# Patient Record
Sex: FEMALE | Race: BLACK OR AFRICAN AMERICAN | Employment: FULL TIME | URBAN - METROPOLITAN AREA
[De-identification: names, ages, dates, MRNs, and addresses within clinical notes are randomized per-mention and may not be internally consistent; named-entity substitution may affect disease eponyms.]

---

## 2017-07-19 ENCOUNTER — OFFICE VISIT (OUTPATIENT)
Dept: ORTHOPEDIC SURGERY | Age: 22
End: 2017-07-19

## 2017-07-19 VITALS
BODY MASS INDEX: 32.14 KG/M2 | TEMPERATURE: 98.6 F | HEIGHT: 66 IN | WEIGHT: 200 LBS | SYSTOLIC BLOOD PRESSURE: 124 MMHG | RESPIRATION RATE: 18 BRPM | DIASTOLIC BLOOD PRESSURE: 68 MMHG | HEART RATE: 96 BPM | OXYGEN SATURATION: 98 %

## 2017-07-19 DIAGNOSIS — M54.2 NECK PAIN: Primary | ICD-10-CM

## 2017-07-19 RX ORDER — IBUPROFEN 600 MG/1
TABLET ORAL
Refills: 0 | COMMUNITY
Start: 2017-06-09 | End: 2021-07-16

## 2017-07-19 RX ORDER — METHOCARBAMOL 750 MG/1
TABLET, FILM COATED ORAL
Refills: 0 | COMMUNITY
Start: 2017-06-09 | End: 2021-07-16

## 2017-07-19 RX ORDER — BUPIVACAINE HYDROCHLORIDE 2.5 MG/ML
4 INJECTION, SOLUTION INFILTRATION; PERINEURAL ONCE
Qty: 4 ML | Refills: 0
Start: 2017-07-19 | End: 2017-07-19

## 2017-07-19 RX ORDER — LIDOCAINE HYDROCHLORIDE 20 MG/ML
4 INJECTION, SOLUTION EPIDURAL; INFILTRATION; INTRACAUDAL; PERINEURAL ONCE
Qty: 4 ML | Refills: 0
Start: 2017-07-19 | End: 2017-07-19

## 2017-07-19 RX ORDER — BETAMETHASONE SODIUM PHOSPHATE AND BETAMETHASONE ACETATE 3; 3 MG/ML; MG/ML
12 INJECTION, SUSPENSION INTRA-ARTICULAR; INTRALESIONAL; INTRAMUSCULAR; SOFT TISSUE ONCE
Qty: 2 ML | Refills: 0
Start: 2017-07-19 | End: 2017-07-19
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
## 2017-07-19 NOTE — MR AVS SNAPSHOT
Visit Information Date & Time Provider Department Dept. Phone Encounter #  
 7/19/2017  2:00 PM Annie Atkinson MD [facility] [phone] [id number] Follow-up Instructions Return in about 6 weeks (around 8/30/2017), or if symptoms worsen or fail to improve. Upcoming Health Maintenance Date Due  
 HPV AGE 9Y-34Y (1 of 3 - Female 3 Dose Series) 3/27/2007 DTaP/Tdap/Td series (1 - Tdap) 3/27/2017 PAP AKA CERVICAL CYTOLOGY 3/27/2017 INFLUENZA AGE 9 TO ADULT 8/1/2017 Allergies as of 7/19/2017  Review Complete On: 7/19/2017 By: Janki Rivera No Known Allergies Current Immunizations  Never Reviewed No immunizations on file. Not reviewed this visit You Were Diagnosed With   
  
 Codes Comments Myofascial pain    -  Primary ICD-10-CM: M79.1 ICD-9-CM: 729.1 Neck pain     ICD-10-CM: M54.2 ICD-9-CM: 723.1 Vitals BP Pulse Temp Resp Height(growth percentile) Weight(growth percentile) 124/68 96 98.6 °F (37 °C) (Oral) 18 5' 6\" (1.676 m) 200 lb (90.7 kg) SpO2 BMI OB Status Smoking Status 98% 32.28 kg/m2 Having regular periods Never Smoker BMI and BSA Data Body Mass Index Body Surface Area  
 32.28 kg/m 2 2.06 m 2 Preferred Pharmacy Pharmacy Name Phone [id number] [address] [place] [address],[address] [facility] [phone] Your Updated Medication List  
  
   
This list is accurate as of: 7/19/17  3:18 PM.  Always use your most recent med list.  
  
  
  
  
 betamethasone 6 mg/mL injection Commonly known as:  CELESTONE SOLUSPAN  
2 mL by IntraMUSCular route once for 1 dose. bupivacaine 0.25 % (2.5 mg/mL) Soln injection Commonly known as:  MARCAINE  
4 mL by SubCUTAneous route once for 1 dose. butalbital-acetaminophen-caffeine -40 mg per tablet Commonly known as:  [employer] Take 1-2 tablets by mouth every 4 hours as needed for headache. Maximum dose 6 capsules daily. DEPO-PROVERA 150 mg/mL injection Generic drug:  medroxyPROGESTERone 150 mg by IntraMUSCular route once. ibuprofen 600 mg tablet Commonly known as:  MOTRIN  
TAKE 1 TABLET BY MOUTH EVERY 8 HOURS AS NEEDED PAIN. TAKE WITH ROBAXIN AND FOOD FOR UP TO 10 DAYS  
  
 lidocaine (PF) 20 mg/mL (2 %) injection Commonly known as:  XYLOCAINE  
4 mL by Other route once for 1 dose. methocarbamol 750 mg tablet Commonly known as:  ROBAXIN  
TAKE 1 TABLET BY MOUTH 3 TIMES A DAY FOR 10 DAYS. TAKE WITH IBUPROFEN AND FOOD  
  
 ondansetron hcl 4 mg tablet Commonly known as:  ZOFRAN (AS HYDROCHLORIDE) Take 1 Tab by mouth every eight (8) hours as needed for Nausea. We Performed the Following AMB POC XRAY, SPINE, CERVICAL; 2 OR 3 [40778 CPT(R)] BETAMETHASONE ACETATE & SODIUM PHOSPHATE INJECTION 3 MG EA. [ [facility]] INJECTION, BUPIVICAINE HYDRO [ [facility]] LIDOCAINE INJECTION [ [facility]] GA INJECT TRIGGER POINT, 1 OR 2 [id number] CPT(R)] REFERRAL TO PHYSICAL THERAPY [XFE90 Custom] Comments:  
 eval and treat Pt has myofascial pain in the cervical region. Follow-up Instructions Return in about 6 weeks (around 8/30/2017), or if symptoms worsen or fail to improve. Referral Information Referral ID Referred By Referred To  
  
 [id number] JAYY Saleh Not Available Visits Status Start Date End Date 1 New Request 7/19/17 7/19/18 If your referral has a status of pending review or denied, additional information will be sent to support the outcome of this decision. Introducing [facility]! Falguni Grimes introduces [employer] patient portal. Now you can access parts of your medical record, email your doctor's office, and request medication refills online. 1. In your internet browser, go to [email or web address] 2. Click on the First Time User? Click Here link in the Sign In box. You will see the New Member Sign Up page. 3. Enter your [employer] group Access Code exactly as it appears below. You will not need to use this code after youve completed the sign-up process. If you do not sign up before the expiration date, you must request a new code. · [employer] Access Code: [email or web address] Expires: 10/17/2017  2:42 PM 
 
4. Enter the last four digits of your Social Security Number (xxxx) and Date of Birth (mm/dd/yyyy) as indicated and click Submit. You will be taken to the next sign-up page. 5. Create a [employer] group ID. This will be your [employer] group login ID and cannot be changed, so think of one that is secure and easy to remember. 6. Create a [employer] group password. You can change your password at any time. 7. Enter your Password Reset Question and Answer. This can be used at a later time if you forget your password. 8. Enter your e-mail address. You will receive e-mail notification when new information is available in [address] 9. Click Sign Up. You can now view and download portions of your medical record. 10. Click the Download Summary menu link to download a portable copy of your medical information. If you have questions, please visit the Frequently Asked Questions section of the [employer] group website. Remember, [employer] group is NOT to be used for urgent needs. For medical emergencies, dial 911. Now available from your iPhone and Android! Please provide this summary of care documentation to your next provider. Your primary care clinician is listed as Tere Reynolds. If you have any questions after today's visit, please call [phone].

## 2017-07-27 ENCOUNTER — HOSPITAL ENCOUNTER (OUTPATIENT)
Dept: PHYSICAL THERAPY | Age: 22
Discharge: HOME | End: 2017-07-27
Payer: COMMERCIAL

## 2017-07-27 PROCEDURE — 97161 PT EVAL LOW COMPLEX 20 MIN: CPT

## 2017-07-27 PROCEDURE — 97110 THERAPEUTIC EXERCISES: CPT

## 2017-07-27 NOTE — PROGRESS NOTES
PT DAILY TREATMENT NOTE     Patient Name: Kvng Andrews  Date:2017  : 1995  [x]  Patient  Verified  Payor: BLUE CROSS / Plan: [facility] PPO / Product Type: PPO /    In time:1030  Out time:1106  Total Treatment Time (min): 36  Visit #: 1 of     Treatment Area: Cervicalgia [M54.2]    SUBJECTIVE  Pain Level (0-10 scale): 1  Any medication changes, allergies to medications, adverse drug reactions, diagnosis change, or new procedure performed?: [x] No    [] Yes (see summary sheet for update)  Subjective functional status/changes:   [] No changes reported       OBJECTIVE    Modality rationale:    Min Type Additional Details    [] Estim:  []Unatt       []IFC  []Premod                        []Other:  []w/ice   []w/heat  Position:  Location:    [] Estim: []Att    []TENS instruct  []NMES                    []Other:  []w/US   []w/ice   []w/heat  Position:  Location:    []  Traction: [] Cervical       []Lumbar                       [] Prone          []Supine                       []Intermittent   []Continuous Lbs:  [] before manual  [] after manual    []  Ultrasound: []Continuous   [] Pulsed                           []1MHz   []3MHz W/cm2:  Location:     []  Iontophoresis with dexamethasone         Location: [] Take home patch   [] In clinic    []  Ice     []  heat  []  Ice massage  []  Laser   []  Anodyne Position:  Location:    []  Laser with stim  []  Other:  Position:  Location:    []  Vasopneumatic Device Pressure:       [] lo [] med [] hi   Temperature: [] lo [] med [] hi   [] Skin assessment post-treatment:  []intact []redness- no adverse reaction    []redness - adverse reaction:     26 min [x]Eval                  []Re-Eval       10 min Therapeutic Exercise:  [] See flow sheet : HEP   Rationale: increase ROM and increase strength to improve the patients ability to perform ADL              With   [] TE   [] TA   [] neuro   [] other: Patient Education: [x] Review HEP    [] Progressed/Changed HEP based on:   [] positioning   [] body mechanics   [] transfers   [] heat/ice application    [] other:      Other Objective/Functional Measures:       Pain Level (0-10 scale) post treatment:  1    ASSESSMENT/Changes in Function:      Patient will continue to benefit from skilled PT services to modify and progress therapeutic interventions, address functional mobility deficits, address ROM deficits, address strength deficits, analyze and address soft tissue restrictions and analyze and cue movement patterns to attain remaining goals.      [x]  See Plan of Care  []  See progress note/recertification  []  See Discharge Summary         Progress towards goals / Updated goals:  Per POC    PLAN  []  Upgrade activities as tolerated     [x]  Continue plan of care  []  Update interventions per flow sheet       []  Discharge due to:_  []  Other:_      Anthony Freeman, PT 7/27/2017  10:37 AM    Future Appointments  Date Time Provider Sandra Jones   8/31/2017 9:30 AM Hina Winn  [address]
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
## 2017-07-31 ENCOUNTER — HOSPITAL ENCOUNTER (OUTPATIENT)
Dept: PHYSICAL THERAPY | Age: 22
Discharge: HOME | End: 2017-07-31
Payer: COMMERCIAL

## 2017-07-31 PROCEDURE — 97140 MANUAL THERAPY 1/> REGIONS: CPT

## 2017-07-31 PROCEDURE — 97112 NEUROMUSCULAR REEDUCATION: CPT

## 2017-07-31 PROCEDURE — 97110 THERAPEUTIC EXERCISES: CPT

## 2017-07-31 NOTE — PROGRESS NOTES
PT DAILY TREATMENT NOTE 3-16    Patient Name: Dejuan Love  Date:2017  : 1995  [x]  Patient  Verified  Payor: Grant Escudero / Plan: [facility] PPO / Product Type: PPO /    In time:2:32  Out time:3:24  Total Treatment Time (min): 52  Visit #: 2 of 8-12    Treatment Area: Cervicalgia [M54.2]    SUBJECTIVE  Pain Level (0-10 scale): 2  Any medication changes, allergies to medications, adverse drug reactions, diagnosis change, or new procedure performed?: [x] No    [] Yes (see summary sheet for update)  Subjective functional status/changes:   [] No changes reported  \"The neck is feeling pretty good today. It is one of those good days. I have not been doing the exercises at home. \" Patient denies any tingling.      OBJECTIVE  Modality rationale: decrease pain and increase tissue extensibility to improve the patients ability to ease ADL tolerance   Min Type Additional Details    [] Estim:  []Unatt       []IFC  []Premod                        []Other:  []w/ice   []w/heat  Position:  Location:    [] Estim: []Att    []TENS instruct  []NMES                    []Other:  []w/US   []w/ice   []w/heat  Position:  Location:    []  Traction: [] Cervical       []Lumbar                       [] Prone          []Supine                       []Intermittent   []Continuous Lbs:  [] before manual  [] after manual    []  Ultrasound: []Continuous   [] Pulsed                           []1MHz   []3MHz Location:  W/cm2:    []  Iontophoresis with dexamethasone         Location: [] Take home patch   [] In clinic   10 []  Ice     [x]  heat  []  Ice massage  []  Laser   []  Anodyne Position: supine with wedge  Location: c/s    []  Laser with stim  []  Other: Position:  Location:    []  Vasopneumatic Device Pressure:       [] lo [] med [] hi   Temperature: [] lo [] med [] hi   [x] Skin assessment post-treatment:  [x]intact []redness- no adverse reaction    []redness - adverse reaction:     24 min Therapeutic Exercise: [x] See flow sheet :   Rationale: increase ROM, increase strength and improve coordination to improve the patients ability to decrease pain with driving    10 min Neuromuscular Re-education:  [x]  See flow sheet : postural re-ed activities   Rationale: increase strength, improve coordination and increase proprioception  to improve the patients ability to improve ability to maintain upright posture     8 min Manual Therapy:  Cervical spine , STM/TPR to bilateral UT and LS   Rationale: decrease pain, increase ROM and increase tissue extensibility to ease ADL tolerance           With   [] TE   [] TA   [] neuro   [] other: Patient Education: [x] Review HEP    [] Progressed/Changed HEP based on:   [] positioning   [] body mechanics   [] transfers   [] heat/ice application    [] other:      Other Objective/Functional Measures: first follow up session, cues for sequencing and correct therex form throughout  Cues to maintain c/s retraction with wall letters   Tactile cues to activate lower trap with wall letter \"y\"    Patient presents with forward rounded shoulders, forward head, and trunk lean to right--cues throughout for upright posture    Pain Level (0-10 scale) post treatment: 0    ASSESSMENT/Changes in Function: Initiated POC per flowsheet. Patient is very pleasant and puts forth good effort with therapy, however, denies compliance with HEP, therapist encourages patient to remain compliant. Patient presents with LS tightness, R>L. Requires frequent cueing to avoid UT hike. Will continue cueing for upright posture. Patient will continue to benefit from skilled PT services to modify and progress therapeutic interventions, address functional mobility deficits, address ROM deficits, address strength deficits, analyze and address soft tissue restrictions, analyze and cue movement patterns, analyze and modify body mechanics/ergonomics and assess and modify postural abnormalities to attain remaining goals.      [] See Plan of Care  []  See progress note/recertification  []  See Discharge Summary         Short Term Goals: To be accomplished in 1 weeks:                         1. Pt will be I and compliant with HEP. -not met per patient report (7/31/2017)  Long Term Goals: To be accomplished in 4 weeks:                         1.improve FOTO to 67 to improve ability for functional tasks                         2. Improve B c/s rotation to 60 degrees to improve ability for driving                         3. Improve lower trap MMT to 4/5 to reduce UT strain and improve activity tolerance                         4. Pt will report being able to perform overhead tasks without difficulty.      PLAN  []  Upgrade activities as tolerated     [x]  Continue plan of care  []  Update interventions per flow sheet       []  Discharge due to:_  []  Other:_      Parveen Richardson 7/31/2017  2:31 PM    Future Appointments  Date Time Provider Sandra Jones   8/1/2017 3:30 PM Parveen Richardson [facility]   8/8/2017 2:30 PM Leopoldo Filter, PTA [facility]   8/11/2017 2:30 PM Hernesto Wells, PT [facility]   8/14/2017 2:30 PM Leopoldo Filter, PTA [facility]   8/16/2017 2:30 PM Leopoldo Filter, PTA [facility]   8/25/2017 2:30 PM Hernesto Wells, PT [facility]   8/31/2017 9:30 AM Eitan Gonzalez  [address]

## 2017-08-01 ENCOUNTER — HOSPITAL ENCOUNTER (OUTPATIENT)
Dept: PHYSICAL THERAPY | Age: 22
Discharge: HOME | End: 2017-08-01
Payer: COMMERCIAL

## 2017-08-01 PROCEDURE — 97140 MANUAL THERAPY 1/> REGIONS: CPT

## 2017-08-01 PROCEDURE — 97112 NEUROMUSCULAR REEDUCATION: CPT

## 2017-08-01 PROCEDURE — 97110 THERAPEUTIC EXERCISES: CPT

## 2017-08-01 NOTE — PROGRESS NOTES
PT DAILY TREATMENT NOTE 3-16    Patient Name: Ros Lao  Date:2017  : 1995  [x]  Patient  Verified  Payor: BLUE CROSS / Plan: [facility] PPO / Product Type: PPO /    In time:3:30  Out time:4:06  Total Treatment Time (min): 36  Visit #: 3 of     Treatment Area: Cervicalgia [M54.2]    SUBJECTIVE  Pain Level (0-10 scale): 0  Any medication changes, allergies to medications, adverse drug reactions, diagnosis change, or new procedure performed?: [x] No    [] Yes (see summary sheet for update)  Subjective functional status/changes:   [] No changes reported  \"I felt really good after last time. \"    OBJECTIVE  Modality rationale: PD   Min Type Additional Details    [] Estim:  []Unatt       []IFC  []Premod                        []Other:  []w/ice   []w/heat  Position:  Location:    [] Estim: []Att    []TENS instruct  []NMES                    []Other:  []w/US   []w/ice   []w/heat  Position:  Location:    []  Traction: [] Cervical       []Lumbar                       [] Prone          []Supine                       []Intermittent   []Continuous Lbs:  [] before manual  [] after manual    []  Ultrasound: []Continuous   [] Pulsed                           []1MHz   []3MHz Location:  W/cm2:    []  Iontophoresis with dexamethasone         Location: [] Take home patch   [] In clinic    []  Ice     []  heat  []  Ice massage  []  Laser   []  Anodyne Position:  Location:    []  Laser with stim  []  Other: Position:  Location:    []  Vasopneumatic Device Pressure:       [] lo [] med [] hi   Temperature: [] lo [] med [] hi   [] Skin assessment post-treatment:  []intact []redness- no adverse reaction    []redness - adverse reaction:     18 min Therapeutic Exercise:  [x] See flow sheet :   Rationale: increase ROM, increase strength and improve coordination to improve the patients ability to decrease pain with driving     10 min Neuromuscular Re-education:  [x]  See flow sheet : postural re-ed activites   Rationale: increase strength, improve coordination and increase proprioception  to improve the patients ability to maintain upright posture     8 min Manual Therapy:  STM/TPR to bilateral UTs, c/s , rib springing   Rationale: decrease pain, increase ROM and increase tissue extensibility to ease ADL tolerance           With   [] TE   [] TA   [] neuro   [] other: Patient Education: [x] Review HEP    [] Progressed/Changed HEP based on:   [] positioning   [] body mechanics   [] transfers   [] heat/ice application    [] other:      Other Objective/Functional Measures:   C/s rotation: R: 47 degrees. Left: 55 degrees. Pain Level (0-10 scale) post treatment: 0    ASSESSMENT/Changes in Function: Patient is making good progress towards goals, reports compliance with HEP today and is better able to maintian upright posture, able to correct trunk lean to right during theraband reps. Patient also reports \"I felt really good after last time. \" Cervical spine rotation has increased since initial eval with no reports of pain at end range. Continue per POC. Patient will continue to benefit from skilled PT services to modify and progress therapeutic interventions, address functional mobility deficits, address ROM deficits, address strength deficits, analyze and address soft tissue restrictions, analyze and cue movement patterns, analyze and modify body mechanics/ergonomics and assess and modify postural abnormalities to attain remaining goals. []  See Plan of Care  []  See progress note/recertification  []  See Discharge Summary         Short Term Goals: To be accomplished in 1 weeks:                         5. Pt will be I and compliant with HEP. -met per patient report (8/1/2017)  Long Term Goals: To be accomplished in 4 weeks:                         6.YXCMKZK FOTO to 79 to improve ability for functional tasks                         2.  Improve B c/s rotation to 60 degrees to improve ability for driving.-progressing, R: 47 degrees. L: 55 degrees with no c/o pain (8/1/2017)                         3. Improve lower trap MMT to 4/5 to reduce UT strain and improve activity tolerance                         4. Pt will report being able to perform overhead tasks without difficulty.      PLAN  []  Upgrade activities as tolerated     [x]  Continue plan of care  []  Update interventions per flow sheet       []  Discharge due to:_  []  Other:_      Gustavo Roberts 8/1/2017  3:31 PM    Future Appointments  Date Time Provider Sandra Jones   8/8/2017 2:30 PM Paul Olmedo, PTA [facility]   8/11/2017 2:30 PM Homa Fitzgerald, PT [facility]   8/14/2017 2:30 PM Paul Olmedo, PTA [facility]   8/16/2017 2:30 PM Paul Olmedo, PTA [facility]   8/25/2017 2:30 PM Homa Fitzgerald, PT [facility]   8/31/2017 9:30 AM Lindy Drummond  [address]

## 2017-08-08 ENCOUNTER — HOSPITAL ENCOUNTER (OUTPATIENT)
Dept: PHYSICAL THERAPY | Age: 22
Discharge: HOME | End: 2017-08-08
Payer: COMMERCIAL

## 2017-08-08 PROCEDURE — 97110 THERAPEUTIC EXERCISES: CPT

## 2017-08-08 PROCEDURE — 97140 MANUAL THERAPY 1/> REGIONS: CPT

## 2017-08-08 PROCEDURE — 97112 NEUROMUSCULAR REEDUCATION: CPT

## 2017-08-08 NOTE — PROGRESS NOTES
PT DAILY TREATMENT NOTE     Patient Name: Ángel Prescott  Date:2017  : 1995  [x]  Patient  Verified  Payor: BLUE CROSS / Plan: [id number] PPO / Product Type: PPO /    In time:2:30  Out time:3:20  Total Treatment Time (min): 50  Visit #: 4 of     Treatment Area: Cervicalgia [M54.2]    SUBJECTIVE  Pain Level (0-10 scale): 6/10  Any medication changes, allergies to medications, adverse drug reactions, diagnosis change, or new procedure performed?: [x] No    [] Yes (see summary sheet for update)  Subjective functional status/changes:   [] No changes reported  \"I'm really achy today. \"    OBJECTIVE    30 min Therapeutic Exercise:  [x] See flow sheet :   Rationale: increase ROM and increase strength to improve the patients ability to perform ADL's. 10 min Neuromuscular Re-education:  [x]  See flow sheet :   Rationale: increase strength and increase proprioception  to improve the patients ability to perform functional activities. 10 min Manual Therapy:  T/S PA mobs, rib springs, [place] technique to (B) UT, lev scap and C/S paraspinals. Rationale: decrease pain, increase ROM, increase tissue extensibility and decrease trigger points to improve activity tolerance. With   [x] TE   [] TA   [] neuro   [] other: Patient Education: [x] Review HEP    [] Progressed/Changed HEP based on:   [] positioning   [] body mechanics   [] transfers   [] heat/ice application    [] other:      Other Objective/Functional Measures: No changes with there ex. Cueing to correct posture, extension education on the importance of good posture. Pain Level (0-10 scale) post treatment: 3/10    ASSESSMENT/Changes in Function: Priscilla Durham. Pt reported a significant decrease in tension/pain post treatment, \"feels a lot looser and I'm not nearly in as much pain as I was. \"    Patient will continue to benefit from skilled PT services to modify and progress therapeutic interventions, address functional mobility deficits, address ROM deficits, address strength deficits, analyze and address soft tissue restrictions and analyze and modify body mechanics/ergonomics to attain remaining goals. [x]  See Plan of Care  []  See progress note/recertification  []  See Discharge Summary         Progress towards goals / Updated goals:  Short Term Goals: To be accomplished in 1 weeks:                         1. Pt will be I and compliant with HEP. -met per patient report (8/1/2017)  Long Term Goals: To be accomplished in 4 weeks:                         6.ZQCKIAA FOTO to 79 to improve ability for functional tasks                         2. Improve B c/s rotation to 60 degrees to improve ability for driving.-progressing, R: 47 degrees. L: 55 degrees with no c/o pain (8/1/2017)                         3. Improve lower trap MMT to 4/5 to reduce UT strain and improve activity tolerance                         4. Pt will report being able to perform overhead tasks without difficulty.      PLAN  []  Upgrade activities as tolerated     [x]  Continue plan of care  []  Update interventions per flow sheet       []  Discharge due to:_  []  Other:_      Augustus Chu, PTA 8/8/2017  2:26 PM    Future Appointments  Date Time Provider Sandra Jones   8/8/2017 2:30 PM Augustus Chu, PTA [facility]   8/11/2017 2:30 PM Armando Arrington, PTA [facility]   8/14/2017 2:30 PM Augustus Chu, PTA [facility]   8/16/2017 2:30 PM Augustus Chu, PTA [facility]   8/25/2017 2:30 PM [facility]   8/31/2017 9:30 AM Karma Guido  [address]

## 2017-08-11 ENCOUNTER — HOSPITAL ENCOUNTER (OUTPATIENT)
Dept: PHYSICAL THERAPY | Age: 22
Discharge: HOME | End: 2017-08-11
Payer: COMMERCIAL

## 2017-08-11 PROCEDURE — 97140 MANUAL THERAPY 1/> REGIONS: CPT

## 2017-08-11 PROCEDURE — 97110 THERAPEUTIC EXERCISES: CPT

## 2017-08-11 NOTE — PROGRESS NOTES
PT DAILY TREATMENT NOTE     Patient Name: Cleveland Johnson  Date:2017  : 1995  [x]  Patient  Verified  Payor: BLUE CROSS / Plan: [facility]ik [id number] PPO / Product Type: PPO /    In time:2:30  Out time:3:00  Total Treatment Time (min): 30  Visit #: 5 of     Treatment Area: Cervicalgia [M54.2]    SUBJECTIVE  Pain Level (0-10 scale): 210  Any medication changes, allergies to medications, adverse drug reactions, diagnosis change, or new procedure performed?: [x] No    [] Yes (see summary sheet for update)  Subjective functional status/changes:   [] No changes reported  Pt reports no new complaints. Pt reports decreased pain since last treatment. Pt reports she feels that the graston treatment has helped her greatly. OBJECTIVE    22 min Therapeutic Exercise:  [x] See flow sheet :   Rationale: increase ROM and increase strength to improve the patients ability to tolerate ADLs. 8 min Manual Therapy:  Rib springs,  C/S, T/S PAs   Rationale: decrease pain, increase ROM and increase tissue extensibility to improve tolerance to functional activities. With   [] TE   [] TA   [] neuro   [] other: Patient Education: [x] Review HEP    [] Progressed/Changed HEP based on:   [] positioning   [] body mechanics   [] transfers   [] heat/ice application    [] other:      Other Objective/Functional Measures: Pt demonstrates improved form with all exercises. Pain Level (0-10 scale) post treatment: 10    ASSESSMENT/Changes in Function: Pt is able to maintain proper posture during exercises but immediately regresses to forward head posture when resting. Patient will continue to benefit from skilled PT services to modify and progress therapeutic interventions, address functional mobility deficits, address ROM deficits, address strength deficits, analyze and address soft tissue restrictions and analyze and cue movement patterns to attain remaining goals.      []  See Plan of Care  []  See progress note/recertification  []  See Discharge Summary         Progress towards goals / Jody Tolentino goals:  Short Term Goals: To be accomplished in 1 weeks:                         1. Pt will be I and compliant with HEP. -met per patient report (8/1/2017)  Long Term Goals: To be accomplished in 4 weeks:                         1.SQTEGMJ FOTO to 79 to improve ability for functional tasks                         2. Improve B c/s rotation to 60 degrees to improve ability for driving.-progressing, R: 47 degrees. L: 55 degrees with no c/o pain (8/1/2017)                         3. Improve lower trap MMT to 4/5 to reduce UT strain and improve activity tolerance                         4. Pt will report being able to perform overhead tasks without difficulty.      PLAN  []  Upgrade activities as tolerated     [x]  Continue plan of care  []  Update interventions per flow sheet       []  Discharge due to:_  []  Other:_      Mikael Pacheco PTA 8/11/2017  2:46 PM    Future Appointments  Date Time Provider Sandra Jones   8/14/2017 2:30 PM Rossy Tiradoing, PTA [facility]   8/16/2017 2:30 PM Rossy Tiradoing, PTA [facility]   8/25/2017 2:30 PM Alisson Warren, PT [facility]   8/31/2017 9:30 AM Amanda Bull  [address]

## 2017-08-14 ENCOUNTER — HOSPITAL ENCOUNTER (OUTPATIENT)
Dept: PHYSICAL THERAPY | Age: 22
Discharge: HOME | End: 2017-08-14
Payer: COMMERCIAL

## 2017-08-14 PROCEDURE — 97110 THERAPEUTIC EXERCISES: CPT

## 2017-08-14 PROCEDURE — 97140 MANUAL THERAPY 1/> REGIONS: CPT

## 2017-08-14 PROCEDURE — 97112 NEUROMUSCULAR REEDUCATION: CPT

## 2017-08-14 NOTE — PROGRESS NOTES
PT DAILY TREATMENT NOTE     Patient Name: Sherryl Mortimer  Date:2017  : 1995  [x]  Patient  Verified  Payor: BLUE CROSS / Plan: [facility] PPO / Product Type: PPO /    In time:2:30  Out time:3:12  Total Treatment Time (min): 42  Visit #: 6 of     Treatment Area: Cervicalgia [M54.2]    SUBJECTIVE  Pain Level (0-10 scale): 10  Any medication changes, allergies to medications, adverse drug reactions, diagnosis change, or new procedure performed?: [x] No    [] Yes (see summary sheet for update)  Subjective functional status/changes:   [] No changes reported  \"My neck doesn't hurt as much and the exercises help loosen my neck. \"    OBJECTIVE    24 min Therapeutic Exercise:  [x] See flow sheet :   Rationale: increase ROM and increase strength to improve the patients ability to perform ADL's.    8 min Neuromuscular Re-education:  [x]  See flow sheet :   Rationale: increase strength and increase proprioception  to improve the patients ability to perform functional activities. 10 min Manual Therapy:  T/S PA mobs, rib springs. Graston technique to (B) UT, lev scap and C/S paraspinals. Rationale: decrease pain, increase ROM, increase tissue extensibility and decrease trigger points to improve activity tolerance. With   [x] TE   [] TA   [] neuro   [] other: Patient Education: [x] Review HEP    [] Progressed/Changed HEP based on:   [] positioning   [] body mechanics   [] transfers   [] heat/ice application    [] other:      Other Objective/Functional Measures:      Pain Level (0-10 scale) post treatment: 1/10    ASSESSMENT/Changes in Function: FOTO improved to 63%. Pt reported a significant decrease in pain/discomfort after treatment. Noted T/S restrictions and (B) UT and lev scap tension.     Patient will continue to benefit from skilled PT services to modify and progress therapeutic interventions, address functional mobility deficits, address ROM deficits, address strength deficits, analyze and address soft tissue restrictions and analyze and modify body mechanics/ergonomics to attain remaining goals. [x]  See Plan of Care  []  See progress note/recertification  []  See Discharge Summary         Progress towards goals / Updated goals:  Short Term Goals: To be accomplished in 1 weeks:                         1. Pt will be I and compliant with HEP. -met per patient report (8/1/2017)  Long Term Goals: To be accomplished in 4 weeks:                         0.ONDLJJC FOTO to 79 to improve ability for functional tasks - Improved to 63%. 8/14/2017                         2. Improve B c/s rotation to 60 degrees to improve ability for driving.-progressing, R: 47 degrees. L: 55 degrees with no c/o pain (8/1/2017)                         3. Improve lower trap MMT to 4/5 to reduce UT strain and improve activity tolerance                         4. Pt will report being able to perform overhead tasks without difficulty.      PLAN  []  Upgrade activities as tolerated     [x]  Continue plan of care  []  Update interventions per flow sheet       []  Discharge due to:_  []  Other:_      Guerrero Conde PTA 8/14/2017  2:31 PM    Future Appointments  Date Time Provider Sandra Jones   8/15/2017 4:00 PM [facility]   8/25/2017 2:30 PM Krista Mishra, PT [facility]   8/31/2017 9:30 AM Taryn Crook  [address]

## 2017-08-15 ENCOUNTER — HOSPITAL ENCOUNTER (OUTPATIENT)
Dept: PHYSICAL THERAPY | Age: 22
Discharge: HOME | End: 2017-08-15
Payer: COMMERCIAL

## 2017-08-15 PROCEDURE — 97110 THERAPEUTIC EXERCISES: CPT

## 2017-08-15 PROCEDURE — 97112 NEUROMUSCULAR REEDUCATION: CPT

## 2017-08-15 PROCEDURE — 97140 MANUAL THERAPY 1/> REGIONS: CPT

## 2017-08-15 NOTE — PROGRESS NOTES
PT DAILY TREATMENT NOTE     Patient Name: Aishwarya Hermosillo  Date:8/15/2017  : 1995  [x]  Patient  Verified  Payor: BLUE CROSS / Plan: [employer] [id number] PPO / Product Type: PPO /    In time:4:00  Out time:4:50  Total Treatment Time (min): 50  Visit #: 7 of     Treatment Area: Cervicalgia [M54.2]    SUBJECTIVE  Pain Level (0-10 scale): 2/10  Any medication changes, allergies to medications, adverse drug reactions, diagnosis change, or new procedure performed?: [x] No    [] Yes (see summary sheet for update)  Subjective functional status/changes:   [] No changes reported  \"Just a little tight/sore. \"    OBJECTIVE    32 min Therapeutic Exercise:  [x] See flow sheet :   Rationale: increase ROM and increase strength to improve the patients ability to perform ADL's. 10 min Neuromuscular Re-education:  [x]  See flow sheet :   Rationale: increase strength and increase proprioception  to improve the patients ability to perform OH activities. 8 min Manual Therapy:  T/S PA mobs, rib springs, T/S UPA's. DTM/TPR (B) UT and lev scap. Rationale: decrease pain, increase ROM, increase tissue extensibility and decrease trigger points to improve activity tolerance. With   [x] TE   [] TA   [] neuro   [] other: Patient Education: [x] Review HEP    [] Progressed/Changed HEP based on:   [] positioning   [] body mechanics   [] transfers   [] heat/ice application    [] other:      Other Objective/Functional Measures: Added standing T-band \"Y\" on shuttle balance and mara press out flexion to improve posture, core activation and shoulder stability/strength. Pain Level (0-10 scale) post treatment: 1/10    ASSESSMENT/Changes in Function: Cueing for TA to maintain proper posture, limited shoulder OH ROM and poor (B) lower trap strength.     Patient will continue to benefit from skilled PT services to modify and progress therapeutic interventions, address functional mobility deficits, address ROM deficits, address strength deficits, analyze and address soft tissue restrictions and analyze and modify body mechanics/ergonomics to attain remaining goals. [x]  See Plan of Care  []  See progress note/recertification  []  See Discharge Summary         Progress towards goals / Updated goals:  Short Term Goals: To be accomplished in 1 weeks:                         1. Pt will be I and compliant with HEP. -met per patient report (8/1/2017)  Long Term Goals: To be accomplished in 4 weeks:                         7.UIVUHJP FOTO to 79 to improve ability for functional tasks - Improved to 63%. 8/14/2017                         2. Improve B c/s rotation to 60 degrees to improve ability for driving.-progressing, R: 47 degrees. L: 55 degrees with no c/o pain (8/1/2017)                         3. Improve lower trap MMT to 4/5 to reduce UT strain and improve activity tolerance                         4. Pt will report being able to perform overhead tasks without difficulty.      PLAN  []  Upgrade activities as tolerated     [x]  Continue plan of care  []  Update interventions per flow sheet       []  Discharge due to:_  []  Other:_      Yana Rivera PTA 8/15/2017  3:44 PM    Future Appointments  Date Time Provider Sandra Jones   8/15/2017 4:00 PM Yana Rivera PTA [facility]   8/25/2017 2:30 PM Anthony Freeman, PT [facility]   8/31/2017 9:30 AM Hina Winn  [address]

## 2017-08-16 ENCOUNTER — APPOINTMENT (OUTPATIENT)
Dept: PHYSICAL THERAPY | Age: 22
End: 2017-08-16
Payer: COMMERCIAL

## 2017-08-22 ENCOUNTER — APPOINTMENT (OUTPATIENT)
Dept: PHYSICAL THERAPY | Age: 22
End: 2017-08-22
Payer: COMMERCIAL

## 2017-08-25 ENCOUNTER — HOSPITAL ENCOUNTER (OUTPATIENT)
Dept: PHYSICAL THERAPY | Age: 22
Discharge: HOME | End: 2017-08-25
Payer: COMMERCIAL

## 2017-08-25 PROCEDURE — 97112 NEUROMUSCULAR REEDUCATION: CPT

## 2017-08-25 PROCEDURE — 97110 THERAPEUTIC EXERCISES: CPT

## 2017-08-25 NOTE — PROGRESS NOTES
[facility]  [address]  [place], [address]  [phone] fax    Physical Therapy Discharge Summary  Patient name: Lois Kelly Start of Care: 2017   Referral source: Dmitri Schwartz MD : 1995                          Medical Diagnosis: Cervicalgia [M54.2] Onset Date:6 months                           Treatment Diagnosis: c/s pain   Prior Hospitalization: see medical history Provider#: [id number]   Medications: Verified on Patient summary List    Comorbidities: n/a   Prior Level of Function: functionally I with activities, works retail and at [employer]    Visits from [place] of Care: 8    Missed Visits: -  Reporting Period : 17 to 17      Summary of Care: pt feels ready for discharge and reports she is able to self manage at home with exercises. Progress towards goals / Updated goals:  Short Term Goals: To be accomplished in 1 weeks:                         1. Pt will be I and compliant with HEP. -met per patient report (2017)  Long Term Goals: To be accomplished in 4 weeks:                         0.MKJZQJT FOTO to 79 to improve ability for functional tasks - MET 70%. 2017                         2. Improve B c/s rotation to 60 degrees to improve ability for driving. -MET 65 degree L , R 62 degrees                         3. Improve lower trap MMT to 4/5 to reduce UT strain and improve activity tolerance- MET 8-15-17                         4. Pt will report being able to perform overhead tasks without difficulty. -progressed 17         ASSESSMENT/RECOMMENDATIONS:  [x]Discontinue therapy: [x]Patient has reached or is progressing toward set goals      []Patient is non-compliant or has abdicated      []Due to lack of appreciable progress towards set [address], PT 2017 3:57 PM

## 2017-08-25 NOTE — PROGRESS NOTES
PT DAILY TREATMENT NOTE     Patient Name: Cristobal Sanchez  Date:2017  : 1995  [x]  Patient  Verified  Payor: BLUE NIKKO / Plan: [facility]ilar [id number] PPO / Product Type: PPO /    In time:2:32  Out time: 3:20  Total Treatment Time (min): 48   Visit #: 8 of     Treatment Area: Cervicalgia [M54.2]    SUBJECTIVE  Pain Level (0-10 scale): 0/10  Any medication changes, allergies to medications, adverse drug reactions, diagnosis change, or new procedure performed?: [x] No    [] Yes (see summary sheet for update)  Subjective functional status/changes:   [] No changes reported      OBJECTIVE    38 min Therapeutic Exercise:  [x] See flow sheet :   Rationale: increase ROM and increase strength to improve the patients ability to perform ADL's. 10 min Neuromuscular Re-education:  [x]  See flow sheet :   Rationale: increase strength and increase proprioception  to improve the patients ability to perform OH activities. With   [] TE   [] TA   [] neuro   [] other: Patient Education: [] Review HEP    [] Progressed/Changed HEP based on:   [] positioning   [] body mechanics   [] transfers   [] heat/ice application    [] other:      Other Objective/Functional Measures: FOTO 70  Pain Level (0-10 scale) post treatment: 1/10    ASSESSMENT/Changes in Function:   Pt reports she is now able to self manage pain with HEP and feels ready for discharge. []  See Plan of Care  []  See progress note/recertification  [x]  See Discharge Summary         Progress towards goals / Updated goals:  Short Term Goals: To be accomplished in 1 weeks:                         1. Pt will be I and compliant with HEP. -met per patient report (2017)  Long Term Goals: To be accomplished in 4 weeks:                         3.KQXQDZU FOTO to 79 to improve ability for functional tasks - MET 70%. 2017                         2. Improve B c/s rotation to 60 degrees to improve ability for driving. -MET 65 degree L , R 62 degrees                         9. Improve lower trap MMT to 4/5 to reduce UT strain and improve activity tolerance- MET 8-15-17                         4. Pt will report being able to perform overhead tasks without difficulty. -progressed 8-25-17    PLAN  []  Upgrade activities as tolerated     []  Continue plan of care  []  Update interventions per flow sheet       [x]  Discharge due to:_  []  Other:_      Hernesto Wells, PT 8/25/2017  2:44 PM    Future Appointments  Date Time Provider Sandra Jones   8/31/2017 9:30 AM Eitan Gonzalez  [address]

## 2017-08-31 ENCOUNTER — OFFICE VISIT (OUTPATIENT)
Dept: ORTHOPEDIC SURGERY | Age: 22
End: 2017-08-31

## 2017-08-31 VITALS
DIASTOLIC BLOOD PRESSURE: 76 MMHG | HEART RATE: 77 BPM | WEIGHT: 201 LBS | OXYGEN SATURATION: 97 % | BODY MASS INDEX: 32.3 KG/M2 | TEMPERATURE: 98.2 F | RESPIRATION RATE: 16 BRPM | HEIGHT: 66 IN | SYSTOLIC BLOOD PRESSURE: 123 MMHG

## 2017-08-31 DIAGNOSIS — M79.18 MYOFASCIAL PAIN: Primary | ICD-10-CM

## 2017-08-31 NOTE — MR AVS SNAPSHOT
Visit Information Date & Time Provider Department Dept. Phone Encounter #  
 8/31/2017  1:30 PM Erasmo Cruz MD [facility] [phone] [id number] Follow-up Instructions Return if symptoms worsen or fail to improve. Upcoming Health Maintenance Date Due  
 HPV AGE 9Y-34Y (1 of 3 - Female 3 Dose Series) 3/27/2007 DTaP/Tdap/Td series (1 - Tdap) 3/27/2017 PAP AKA CERVICAL CYTOLOGY 3/27/2017 INFLUENZA AGE 9 TO ADULT 8/1/2017 Allergies as of 8/31/2017  Review Complete On: 8/31/2017 By: Judie Boston No Known Allergies Current Immunizations  Never Reviewed No immunizations on file. Not reviewed this visit You Were Diagnosed With   
  
 Codes Comments Myofascial pain    -  Primary ICD-10-CM: M79.1 ICD-9-CM: 729.1 Vitals BP Pulse Temp Resp Height(growth percentile) Weight(growth percentile) 123/76 77 98.2 °F (36.8 °C) (Oral) 16 5' 6\" (1.676 m) 201 lb (91.2 kg) SpO2 BMI OB Status Smoking Status 97% 32.44 kg/m2 Having regular periods Never Smoker BMI and BSA Data Body Mass Index Body Surface Area  
 32.44 kg/m 2 2.06 m 2 Preferred Pharmacy Pharmacy Name Phone [id number] [place] [place] [phone] Your Updated Medication List  
  
   
This list is accurate as of: 8/31/17  2:05 PM.  Always use your most recent med list.  
  
  
  
  
 butalbital-acetaminophen-caffeine -40 mg per tablet Commonly known as:  [employer] Take 1-2 tablets by mouth every 4 hours as needed for headache. Maximum dose 6 capsules daily. DEPO-PROVERA 150 mg/mL injection Generic drug:  medroxyPROGESTERone 150 mg by IntraMUSCular route once. ibuprofen 600 mg tablet Commonly known as:  MOTRIN  
TAKE 1 TABLET BY MOUTH EVERY 8 HOURS AS NEEDED PAIN. TAKE WITH ROBAXIN AND FOOD FOR UP TO 10 DAYS  
  
 methocarbamol 750 mg tablet Commonly known as:  ROBAXIN  
TAKE 1 TABLET BY MOUTH 3 TIMES A DAY FOR 10 DAYS. TAKE WITH IBUPROFEN AND FOOD  
  
 ondansetron hcl 4 mg tablet Commonly known as:  ZOFRAN (AS HYDROCHLORIDE) Take 1 Tab by mouth every eight (8) hours as needed for Nausea. Follow-up Instructions Return if symptoms worsen or fail to improve. Introducing \[facility]\"" & [facility] SERVICES! Maribeth Schmid introduces [employer] patient portal. Now you can access parts of your medical record, email your doctor's office, and request medication refills online. 1. In your internet browser, go to [email or web address] - [employer] 2. Click on the First Time User? Click Here link in the Sign In box. You will see the New Member Sign Up page. 3. Enter your [employer] Access Code exactly as it appears below. You will not need to use this code after youve completed the sign-up process. If you do not sign up before the expiration date, you must request a new code. · [employer] Access Code: [email or web address] Expires: 10/17/2017  2:42 PM 
 
4. Enter the last four digits of your Social Security Number (xxxx) and Date of Birth (mm/dd/yyyy) as indicated and click Submit. You will be taken to the next sign-up page. 5. Create a [employer] ID. This will be your [employer] login ID and cannot be changed, so think of one that is secure and easy to remember. 6. Create a [employer] password. You can change your password at any time. 7. Enter your Password Reset Question and Answer. This can be used at a later time if you forget your password. 8. Enter your e-mail address. You will receive e-mail notification when new information is available in [address] 9. Click Sign Up. You can now view and download portions of your medical record. 10. Click the Download Summary menu link to download a portable copy of your medical information.  
 
If you have questions, please visit the Frequently Asked Questions section of the [employer]. Remember, [employer] is NOT to be used for urgent needs. For medical emergencies, dial 911. Now available from your iPhone and Android! Please provide this summary of care documentation to your next provider. Your primary care clinician is listed as Eli Alvarez. If you have any questions after today's visit, please call [phone].

## 2017-08-31 NOTE — PROGRESS NOTES
[address]  [place], [address] [place] [address] Street  Phone: [phone]  Fax: [phone]        Antoinette Hall  : 1995  PCP: Kennis Cogan,   2017    PROGRESS NOTE      ASSESSMENT AND PLAN    Ja Ngo comes in to the office today for a PT f/u. Her myofascial pain has significantly improved with the sessions. She will now transition into a HEP to provide relief if the symptoms return. Pt will f/u prn. Diagnoses and all orders for this visit:    1. Myofascial pain       Follow-up Disposition:  Return if symptoms worsen or fail to improve. HISTORY OF PRESENT ILLNESS  Ja Ngo is a 25 y.o. female. A&P / HPI from 2017:  Ja Ngo comes in to the office today c/o aching pain in the cervical region. The inciting event is unclear.      Upon the physical examination she has localized tenderness at the C6 spinous process. She has no neurological defects.      I performed a trigger point injection at the interspinous ligaments above and below the spinous process with full relief immediately.      I referred her to physical therapy for further treatment. I imagine some mobilization would be helpful. The paraspinals are not affected. Updates from 17:  Pt presents for a PT f/u. Her pain is currently rated at an 0/10. The sessions improved her symptoms and she has transitioned into a HEP to further her improvement. The trigger point injection provided relief for approximately 1.5 weeks. PAST MEDICAL HISTORY   Past Medical History:   Diagnosis Date    Sports injuries     head       Past Surgical History:   Procedure Laterality Date    HX OTHER SURGICAL      Finger (tendon)    HX WISDOM TEETH EXTRACTION     .       MEDICATIONS      Current Outpatient Prescriptions   Medication Sig Dispense Refill    medroxyPROGESTERone (DEPO-PROVERA) 150 mg/mL injection 150 mg by IntraMUSCular route once.  ibuprofen (MOTRIN) 600 mg tablet TAKE 1 TABLET BY MOUTH EVERY 8 HOURS AS NEEDED PAIN. TAKE WITH ROBAXIN AND FOOD FOR UP TO 10 DAYS  0    methocarbamol (ROBAXIN) 750 mg tablet TAKE 1 TABLET BY MOUTH 3 TIMES A DAY FOR 10 DAYS. TAKE WITH IBUPROFEN AND FOOD  0    ondansetron hcl (ZOFRAN, AS HYDROCHLORIDE,) 4 mg tablet Take 1 Tab by mouth every eight (8) hours as needed for Nausea. 12 Tab 0    butalbital-acetaminophen-caffeine (FIORICET) -40 mg per tablet Take 1-2 tablets by mouth every 4 hours as needed for headache. Maximum dose 6 capsules daily. 15 Tab 0        ALLERGIES  No Known Allergies       SOCIAL HISTORY    Social History     Social History    Marital status: SINGLE     Spouse name: N/A    Number of children: N/A    Years of education: N/A     Social History Main Topics    Smoking status: Never Smoker    Smokeless tobacco: Never Used    Alcohol use No    Drug use: No    Sexual activity: Not Asked     Other Topics Concern    None     Social History Narrative       FAMILY HISTORY  History reviewed. No pertinent family history. REVIEW OF SYSTEMS  Review of Systems   Constitutional: Negative for chills, diaphoresis, fever, malaise/fatigue and weight loss. Respiratory: Negative for shortness of breath. Cardiovascular: Negative for chest pain and leg swelling. Gastrointestinal: Negative for constipation, nausea and vomiting. Neurological: Negative for dizziness, tingling, seizures, loss of consciousness, weakness and headaches. Psychiatric/Behavioral: The patient does not have insomnia. PHYSICAL EXAMINATION  Visit Vitals    /76    Pulse 77    Temp 98.2 °F (36.8 °C) (Oral)    Resp 16    Ht 5' 6\" (1.676 m)    Wt 201 lb (91.2 kg)    SpO2 97%    BMI 32.44 kg/m2       No flowsheet data found. Constitutional:  Well developed, well nourished, in no acute distress. Psychiatric: Affect and mood are appropriate.    Integumentary: No rashes or abrasions noted on exposed areas. SPINE/MUSCULOSKELETAL EXAM    Cervical spine:  Neck is midline. Normal muscle tone. No focal atrophy is noted. ROM pain free. Shoulder ROM intact. Tenderness to palpation. Negative Spurling's sign. Negative Tinel's sign. Negative Cronin's sign.       Sensation in the bilateral arms grossly intact to light touch.      Lumbar spine:  No rash, ecchymosis, or gross obliquity. No fasciculations. No focal atrophy is noted. No pain with hip ROM. Full range of motion. No tenderness to palpation. No tenderness to palpation at the sciatic notch. SI joints non-tender. Trochanters non tender.      Sensation in the bilateral legs grossly intact to light touch. MOTOR:      Biceps  Triceps Deltoids Wrist Ext Wrist Flex Hand Intrin   Right 5/5 5/5 5/5 5/5 5/5 5/5   Left 5/5 5/5 5/5 5/5 5/5 5/5             Hip Flex  Quads Hamstrings Ankle DF EHL Ankle PF   Right 5/5 5/5 5/5 5/5 5/5 5/5   Left 5/5 5/5 5/5 5/5 5/5 5/5     DTRs are 2+ biceps, triceps, brachioradialis, patella, and Achilles.     Negative Straight Leg raise. Squat not tested. No difficulty with tandem gait.      Ambulation without assistive device. FWB.       RADIOGRAPHS  Cervical XR images taken on 07/19/2017 personally reviewed with patient:  No acute findings or fractures.       reviewed     Ms. Camilo Bacon has a reminder for a \"due or due soon\" health maintenance. I have asked that she contact her primary care provider for follow-up on this health maintenance.      This plan was reviewed with the patient and patient agrees. All questions were answered.  More than half of this visit today was spent on counseling.     Written by King Stevens, as dictated by Dr. Rhett Archer, Dr. Jeffery Alcazar, confirm that all documentation is accurate.

## 2021-07-12 PROBLEM — Z34.90 PREGNANCY: Status: ACTIVE | Noted: 2021-07-12

## 2022-03-19 PROBLEM — Z34.90 PREGNANCY: Status: ACTIVE | Noted: 2021-07-12

## 2024-01-17 ENCOUNTER — HOSPITAL ENCOUNTER (EMERGENCY)
Facility: HOSPITAL | Age: 29
Discharge: HOME | End: 2024-01-17
Attending: EMERGENCY MEDICINE
Payer: MEDICAID

## 2024-01-17 VITALS
OXYGEN SATURATION: 99 % | DIASTOLIC BLOOD PRESSURE: 75 MMHG | RESPIRATION RATE: 18 BRPM | SYSTOLIC BLOOD PRESSURE: 122 MMHG | HEIGHT: 65 IN | BODY MASS INDEX: 27.49 KG/M2 | TEMPERATURE: 97.2 F | HEART RATE: 104 BPM | WEIGHT: 165 LBS

## 2024-01-17 DIAGNOSIS — J20.9 ACUTE BRONCHITIS, UNSPECIFIED ORGANISM: Primary | ICD-10-CM

## 2024-01-17 LAB
FLUAV AG NPH QL IA: NEGATIVE
FLUBV AG NOSE QL IA: NEGATIVE
SARS-COV-2 RDRP RESP QL NAA+PROBE: NOT DETECTED
SOURCE: NORMAL

## 2024-01-17 PROCEDURE — 87804 INFLUENZA ASSAY W/OPTIC: CPT

## 2024-01-17 PROCEDURE — 99283 EMERGENCY DEPT VISIT LOW MDM: CPT

## 2024-01-17 PROCEDURE — 87635 SARS-COV-2 COVID-19 AMP PRB: CPT

## 2024-01-17 PROCEDURE — 6370000000 HC RX 637 (ALT 250 FOR IP): Performed by: EMERGENCY MEDICINE

## 2024-01-17 RX ORDER — IBUPROFEN 600 MG/1
600 TABLET ORAL
Status: COMPLETED | OUTPATIENT
Start: 2024-01-17 | End: 2024-01-17

## 2024-01-17 RX ORDER — IBUPROFEN 600 MG/1
600 TABLET ORAL 3 TIMES DAILY PRN
Qty: 30 TABLET | Refills: 0 | Status: SHIPPED | OUTPATIENT
Start: 2024-01-17

## 2024-01-17 RX ORDER — AMOXICILLIN 250 MG/1
500 CAPSULE ORAL
Status: COMPLETED | OUTPATIENT
Start: 2024-01-17 | End: 2024-01-17

## 2024-01-17 RX ORDER — AMOXICILLIN 500 MG/1
500 CAPSULE ORAL 3 TIMES DAILY
Qty: 21 CAPSULE | Refills: 0 | Status: SHIPPED | OUTPATIENT
Start: 2024-01-17 | End: 2024-01-24

## 2024-01-17 RX ADMIN — IBUPROFEN 600 MG: 600 TABLET, FILM COATED ORAL at 20:57

## 2024-01-17 RX ADMIN — AMOXICILLIN 500 MG: 250 CAPSULE ORAL at 20:57

## 2024-01-17 ASSESSMENT — ENCOUNTER SYMPTOMS
WHEEZING: 0
ABDOMINAL PAIN: 0
COUGH: 1
SORE THROAT: 0
SHORTNESS OF BREATH: 0

## 2024-01-17 ASSESSMENT — LIFESTYLE VARIABLES
HOW OFTEN DO YOU HAVE A DRINK CONTAINING ALCOHOL: NEVER
HOW MANY STANDARD DRINKS CONTAINING ALCOHOL DO YOU HAVE ON A TYPICAL DAY: PATIENT DOES NOT DRINK

## 2024-01-17 ASSESSMENT — PAIN - FUNCTIONAL ASSESSMENT: PAIN_FUNCTIONAL_ASSESSMENT: 0-10

## 2024-01-17 ASSESSMENT — PAIN SCALES - GENERAL: PAINLEVEL_OUTOF10: 7

## 2024-01-18 NOTE — ED PROVIDER NOTES
[facility] EMERGENCY DEPT  eMERGENCY dEPARTMENT eNCOUnter      Pt Name: Rena Castillo  MRN: [id number]  Birthdate 1995 of evaluation: 1/17/2024  Provider:Duc Sandoval MD    CHIEF COMPLAINT         HPI    Rena Castillo is a 28 y.o. female  c/o having malaise, non productive cough, pleuritic chest pain with cough, no fever or chills.    ROS    Review of Systems   Constitutional:  Positive for activity change.   HENT:  Negative for congestion and sore throat.    Respiratory:  Positive for cough. Negative for shortness of breath and wheezing.    Cardiovascular:  Negative for chest pain ((+) pain with coughing).   Gastrointestinal:  Negative for abdominal pain.   Genitourinary: Negative.    Musculoskeletal:  Negative for arthralgias and myalgias.   Skin:  Positive for rash.       Except as noted above the remainder of the review of systems was reviewed and negative.       PAST MEDICAL HISTORY     Past Medical History:   Diagnosis Date    Sports injuries     head         SURGICAL HISTORY       Past Surgical History:   Procedure Laterality Date    OTHER SURGICAL HISTORY      Finger (tendon)    WISDOM TOOTH EXTRACTION           CURRENTMEDICATIONS       Previous Medications    IBUPROFEN (ADVIL;MOTRIN) 600 MG TABLET    Take 600 mg by mouth every 6 hours as needed    LABETALOL (NORMODYNE) 300 MG TABLET    Take 300 mg by mouth 2 times daily    NIFEDIPINE (PROCARDIA XL) 30 MG EXTENDED RELEASE TABLET    Take 30 mg by mouth daily    ZOLPIDEM (AMBIEN) 5 MG TABLET    Take 5 mg by mouth.       ALLERGIES     Patient has no known allergies.    FAMILY HISTORY     No family history on file.       SOCIAL HISTORY       Social History     Socioeconomic History    Marital status: Single   Tobacco Use    Smoking status: Never    Smokeless tobacco: Never   Substance and Sexual Activity    Alcohol use: Not Currently    Drug use: No         PHYSICAL EXAM       ED Triage Vitals [01/17/24 1846]   BP Temp Temp Source Pulse Respirations